# Patient Record
Sex: FEMALE | Race: WHITE
[De-identification: names, ages, dates, MRNs, and addresses within clinical notes are randomized per-mention and may not be internally consistent; named-entity substitution may affect disease eponyms.]

---

## 2018-06-20 ENCOUNTER — HOSPITAL ENCOUNTER (EMERGENCY)
Dept: HOSPITAL 31 - C.ER | Age: 58
Discharge: HOME | End: 2018-06-20
Payer: COMMERCIAL

## 2018-06-20 VITALS
OXYGEN SATURATION: 100 % | DIASTOLIC BLOOD PRESSURE: 78 MMHG | HEART RATE: 65 BPM | SYSTOLIC BLOOD PRESSURE: 116 MMHG | TEMPERATURE: 98.5 F | RESPIRATION RATE: 16 BRPM

## 2018-06-20 DIAGNOSIS — K57.90: Primary | ICD-10-CM

## 2018-06-20 LAB
ALBUMIN SERPL-MCNC: 4.4 G/DL (ref 3.5–5)
ALBUMIN/GLOB SERPL: 1.1 {RATIO} (ref 1–2.1)
ALT SERPL-CCNC: 78 U/L (ref 9–52)
AST SERPL-CCNC: 55 U/L (ref 14–36)
BASOPHILS # BLD AUTO: 0 K/UL (ref 0–0.2)
BASOPHILS NFR BLD: 0.8 % (ref 0–2)
BILIRUB UR-MCNC: NEGATIVE MG/DL
BUN SERPL-MCNC: 16 MG/DL (ref 7–17)
CALCIUM SERPL-MCNC: 9.4 MG/DL (ref 8.6–10.4)
EOSINOPHIL # BLD AUTO: 0.4 K/UL (ref 0–0.7)
EOSINOPHIL NFR BLD: 7.5 % (ref 0–4)
ERYTHROCYTE [DISTWIDTH] IN BLOOD BY AUTOMATED COUNT: 13.2 % (ref 11.5–14.5)
GFR NON-AFRICAN AMERICAN: > 60
GLUCOSE UR STRIP-MCNC: NORMAL MG/DL
HGB BLD-MCNC: 14 G/DL (ref 11–16)
LEUKOCYTE ESTERASE UR-ACNC: (no result) LEU/UL
LIPASE: 196 U/L (ref 23–300)
LYMPHOCYTES # BLD AUTO: 1.7 K/UL (ref 1–4.3)
LYMPHOCYTES NFR BLD AUTO: 29.9 % (ref 20–40)
MCH RBC QN AUTO: 30.3 PG (ref 27–31)
MCHC RBC AUTO-ENTMCNC: 33.8 G/DL (ref 33–37)
MCV RBC AUTO: 89.6 FL (ref 81–99)
MONOCYTES # BLD: 0.4 K/UL (ref 0–0.8)
MONOCYTES NFR BLD: 7.5 % (ref 0–10)
NEUTROPHILS # BLD: 3.1 K/UL (ref 1.8–7)
NEUTROPHILS NFR BLD AUTO: 54.3 % (ref 50–75)
NRBC BLD AUTO-RTO: 0.1 % (ref 0–2)
PH UR STRIP: 5 [PH] (ref 5–8)
PLATELET # BLD: 263 K/UL (ref 130–400)
PMV BLD AUTO: 8.5 FL (ref 7.2–11.7)
PROT UR STRIP-MCNC: NEGATIVE MG/DL
RBC # BLD AUTO: 4.62 MIL/UL (ref 3.8–5.2)
RBC # UR STRIP: NEGATIVE /UL
SP GR UR STRIP: 1.02 (ref 1–1.03)
SQUAMOUS EPITHIAL: < 1 /HPF (ref 0–5)
UROBILINOGEN UR-MCNC: NORMAL MG/DL (ref 0.2–1)
WBC # BLD AUTO: 5.8 K/UL (ref 4.8–10.8)

## 2018-06-20 PROCEDURE — 96374 THER/PROPH/DIAG INJ IV PUSH: CPT

## 2018-06-20 PROCEDURE — 87086 URINE CULTURE/COLONY COUNT: CPT

## 2018-06-20 PROCEDURE — 81001 URINALYSIS AUTO W/SCOPE: CPT

## 2018-06-20 PROCEDURE — 74176 CT ABD & PELVIS W/O CONTRAST: CPT

## 2018-06-20 PROCEDURE — 96375 TX/PRO/DX INJ NEW DRUG ADDON: CPT

## 2018-06-20 PROCEDURE — 99285 EMERGENCY DEPT VISIT HI MDM: CPT

## 2018-06-20 PROCEDURE — 85025 COMPLETE CBC W/AUTO DIFF WBC: CPT

## 2018-06-20 PROCEDURE — 80053 COMPREHEN METABOLIC PANEL: CPT

## 2018-06-20 PROCEDURE — 83690 ASSAY OF LIPASE: CPT

## 2018-06-20 NOTE — C.PDOC
History Of Present Illness


58yo female, presents to ED for evaluation of an episode of hematuria this 

morning and right lower quadrant abdominal pain since last night. Patient 

states she had chills, nausea, vomiting, diarrhea and myalgia for the past 4 

days but those symptoms have now resolved. Patient states she is unsure if her 

abdominal pain was present over the past 4 days due to her other symptoms. She 

denies any fever, dysuria or polyruia. She was evaluated by her PMD yesterday 

for her previous symptoms and was supposed to give a stool sample; patient 

states her PMD is unaware of the hematuria. She reports a history of kidney 

stones but states the current symptoms are not similar to prior. Patient denies 

any chest pain, shortness of breath, vomiting, and offers no other medical 

complaints. 


Time Seen by Provider: 18 09:56


Chief Complaint (Nursing): Female Genitourinary


History Per: Patient


History/Exam Limitations: no limitations


Onset/Duration Of Symptoms: Days (1)


Current Symptoms Are (Timing): Still Present


Additional History Per: Patient





Past Medical History


Reviewed: Historical Data, Nursing Documentation, Vital Signs


Vital Signs: 


 Last Vital Signs











Temp  98.1 F   18 09:37


 


Pulse  80   18 12:00


 


Resp  20   18 12:00


 


BP  137/83   18 12:00


 


Pulse Ox  100   18 12:00














- Medical History


PMH: HTN, Kidney Stones, Migraine


Surgical History: Cholecystectomy (17 YRS AGO), 


Other Surgeries: ureteral stents


Family History: States: No Known Family Hx, Unknown Family Hx





- Social History


Hx Tobacco Use: No


Hx Alcohol Use: No


Hx Substance Use: No





- Immunization History


Hx Tetanus Toxoid Vaccination: Yes


Hx Influenza Vaccination: Yes


Hx Pneumococcal Vaccination: Yes





Review Of Systems


Except As Marked, All Systems Reviewed And Found Negative.


Constitutional: Negative for: Fever, Chills, Malaise


Cardiovascular: Negative for: Chest Pain


Respiratory: Negative for: Shortness of Breath


Gastrointestinal: Positive for: Abdominal Pain (right lower quadrant).  

Negative for: Nausea, Vomiting, Diarrhea


Genitourinary: Positive for: Hematuria.  Negative for: Dysuria, Frequency, 

Other (polyuria)





Physical Exam





- Physical Exam


Appears: Non-toxic, Other (mild painful distres)


Skin: Normal Color, Warm, Dry


Head: Atraumatic, Normacephalic


Eye(s): bilateral: Normal Inspection, EOMI


Nose: Normal


Oral Mucosa: Moist


Neck: Normal ROM, Supple


Chest: Symmetrical


Cardiovascular: Rhythm Regular


Respiratory: Normal Breath Sounds


Gastrointestinal/Abdominal: Soft, Tenderness (right lower quadrant), No Guarding

, No Rebound


Back: Normal Inspection, No CVA Tenderness


Extremity: Normal ROM, No Pedal Edema


Neurological/Psych: Oriented x3





ED Course And Treatment





- Laboratory Results


Result Diagrams: 


 18 10:51





 18 10:51


O2 Sat by Pulse Oximetry: 98 (RA)


Pulse Ox Interpretation: Normal


Progress Note: Labs, CT abdomen/pelvis ordered. Patient given Lidocaine and 

Toradol for pain relief.





Progress





- Re-Evaluation


Re-evaluation Note: 





18 11:27


D/W DR CHAVEZ AWARE OF ER FINDINGS. CT PENDING. DC IF NEG


18 13:04


D/W DR CHAEVZ AWARE OF CT FINDINGS, FU OFFICE


18 13:21


APPEARS COMFORTABLE FU OFFICE





- Data Reviewed


Data Reviewed: Lab, Diagnostic imaging, Old records





Disposition


Counseled Patient/Family Regarding: Studies Performed, Diagnosis, Need For 

Followup





- Disposition


Referrals: 


Karla Chavez MD [Staff Provider] - 


Disposition: HOME/ ROUTINE


Disposition Time: 13:22


Condition: GOOD


Instructions:  Diverticulosis (DC)


Forms:  [facility] (English), Work/School/Gym Excuse





- Clinical Impression


Clinical Impression: 


 Diverticulosis








- Scribe Statement


The provider has reviewed the documentation as recorded by the Scribe (Taniya Boggs)


Provider Attestation: 


All medical record entries made by the Scribe were at my direction and 

personally dictated by me. I have reviewed the chart and agree that the record 

accurately reflects my personal performance of the history, physical exam, 

medical decision making, and the department course for this patient. I have 

also personally directed, reviewed, and agree with the discharge instructions 

and disposition.

## 2018-06-20 NOTE — CT
PROCEDURE:  CT Abdomen and Pelvis without intravenous contrast



HISTORY:

RLQ PAIN HEMATURIA RO RENAL STONE VS APPY



COMPARISON:

Abdomen ultrasound 03/11/2015.



TECHNIQUE:

Helical CT of the abdomen and pelvis was performed without oral or 

intravenous contrast as per referring physician request. 



Contrast dose: None



Radiation dose:



Total exam DLP = 495.50 mGy-cm.



This CT exam was performed using one or more of the following dose 

reduction techniques: Automated exposure control, adjustment of the 

mA and/or kV according to patient size, and/or use of iterative 

reconstruction technique.



FINDINGS:



LOWER THORAX:

Unremarkable. 



LIVER:

Unremarkable. No gross lesion or ductal dilatation.  



GALLBLADDER AND BILE DUCTS:

Prior cholecystectomy reiterated. 



PANCREAS:

Unremarkable. No gross lesion or ductal dilatation.



SPLEEN:

Unremarkable. 



ADRENALS:

Unremarkable. No mass. 



KIDNEYS AND URETERS:

No obstructive uropathy bilaterally.  Multiple intrarenal calculi 

identified bilaterally, numbering at least 2 which are only 1-2 mm 

greatest dimension the right kidney's Lower and midpole regions with 

2 or 3 seen at the left kidney. The largest at the left measures 6.3 

mm at the lower pole. The cyst is seen measure 1.5 cm greatest 

dimension exophytic posteriorly off the midpole right kidney. 



VASCULATURE:

Unremarkable. No aortic aneurysm. 



BOWEL:

Stomach is collapsed. No bowel obstruction evident.  Moderate fecal 

loading seen throughout the colon.  Scattered colonic diverticular 

appreciated which not appear acute. Left hemicolon is more affected 

than right. 



APPENDIX:

Unremarkable. Normal appendix. 



PERITONEUM:

Unremarkable. No free fluid. No free air. 



LYMPH NODES:

Unremarkable. No enlarged lymph nodes. 



BLADDER:

Unremarkable. 



REPRODUCTIVE:

Unremarkable. 



BONES:

No acute fracture. 



OTHER FINDINGS:

None.



IMPRESSION:

Nonobstructing intrarenal calculi are identified bilaterally.  A 

small cyst seen related to the right kidney. Urinary bladder appears 

unremarkable.



Prior cholecystectomy.



Diffuse colonic diverticulosis without diverticulitis.